# Patient Record
Sex: FEMALE | Race: WHITE | Employment: UNEMPLOYED | URBAN - METROPOLITAN AREA
[De-identification: names, ages, dates, MRNs, and addresses within clinical notes are randomized per-mention and may not be internally consistent; named-entity substitution may affect disease eponyms.]

---

## 2019-01-14 NOTE — H&P
Date: 10/29/2018 10:45 AM  
Patient Name: Tracy Ricardo Account #: [id number] Gender: Female  (age): 1970 (46) Provider:    
Heather Phalen. Amparo Boggs MD  
  
 
Referring Physician:    
Natalie Crain MD 
[address] [facility]. Patience NOBLE [address] 
[phone] (phone) 
[phone] (fax) Chief Complaint: Constipation History of Present Illness:  
46-year-old female with no significant past [place] [facility] history presents complaining of an approximate 5 year history of constipation described as infrequent bowel movements associated with bloating generalized abdominal discomfort which is relieved by bowel movement she has been using over-the-counter vegetable laxatives periodically to have movements per this been no bleeding or weight loss changes to caliber. There is no family history of colon cancer. She is aged 46 and is due for colorectal cancer screening. We discussed options of adding additional fiber to her diet and I gave her permission to use vegetable laxatives once or twice a week as needed. We discussed pharmacologic options but she would prefer to stay away from those at this time. ? 46-year-old female with no significant past [facility] history presents complaining of an approximate 5 year history of constipation described as infrequent bowel movements associated with bloating generalized abdominal discomfort which is relieved by bowel movement she has been using over-the-counter vegetable laxatives periodically to have movements per this been no bleeding or weight loss changes to caliber. There is no family history of colon cancer. She is aged 46 and is due for colorectal cancer screening. We discussed options of adding additional fiber to her diet and I gave her permission to use vegetable laxatives once or twice a week as needed. We discussed pharmacologic options but she would prefer to stay away from those at this time. ? Past Medical History Medical Conditions: Hemorrhoids Seizures Surgical Procedures: Ear SX, 2008 Hernia, 2000 Tonsils, 1973 Dx Studies: Abdominal U/S, 98, 99 Medications: Alyacen 1/35 (28) 1-35 mg-mcg TAKE 1 TABLET BY MOUTH EVERY DAY Allergies: Sumycin Immunizations: No Immunizations Social History Alcohol: Alcohol consumption: Weekly. Tobacco: Former smokerOther, quit 1989. Drugs: None Exercise: Exercise less than 3 times a week. Caffeine: None Marital Status:   
  
  
Occupation:   
  
  
  
 
Family History No history of Colon Cancer, Esophogeal Cancer, GI Cancers, IBD (Crohn's or UC), Liver disease Mother: Diagnosed with Family history of colon polyps; Father: Diagnosed with Prostate Cancer;   
  
Review of Systems:  
Cardiovascular: Presents suffers from irregular heart beat. Denies chest pain, palpitations, peripheral edema, syncope, Sweats. Constitutional: Denies fatigue, fever, loss of appetite, weight gain, weight loss. ENMT: Presents suffers from sore throat. Denies nose bleeds, hearing loss. Endocrine: Denies excessive thirst, heat intolerance. Eyes: Denies loss of vision. Gastrointestinal: Presents suffers from constipation, Bloating/gas. Denies abdominal pain, abdominal swelling, change in bowel habits, diarrhea, heartburn, jaundice, nausea, rectal bleeding, stomach cramps, vomiting, dysphagia, rectal pain, Stool incontinence, hematemesis. Genitourinary: Denies dark urine, dysuria, frequent urination, hematuria, incontinence. Hematologic/Lymphatic: Denies easy bruising, prolonged bleeding. Integumentary: Denies itching, rashes, sun sensitivity. Musculoskeletal: Presents suffers from joint pain. Denies arthritis, back pain, gout, muscle weakness, stiffness. Neurological: Denies dizziness, fainting, frequent headaches, memory loss. Psychiatric: Denies anxiety, depression, difficulty sleeping, hallucinations, nervousness, panic attacks, paranoia. Respiratory: Presents suffers from cough. Denies dyspnea, wheezing. Vital Signs:  
BP 
(mmHg)  Pulse 
(ppm) Rhythm Weight (lbs/oz) Height (ft/in) BMI Resp/min Temp 114/79 61 Regular 140 /  5 / 4 24.03 10 97.8 (F) Physical Exam:  
Constitutional:   
 
Appearance: No distress, appears comfortable. Communication: Understands/receives spoken information. Skin: Inspection: No rash, no jaundice. Palpation: No subcutaneous nodules. Head/face: Inspection: Normacephalic, atraumatic. Palpation: normal.  
Eyes:   
 
Conjunctivae/lids: Normal.  
Pupils/irises: Pupils equal, round and normal.  
ENMT:   
 
External: Normal.  
Hearing: Normal.  
Neck:   
 
Neck: Normal appearance, trachea midline. Jugular veins: No JVD noted. Respiratory:   
 
Effort: Normal respiratory effort, comfortable, speaks in complete sentences. Auscultation: normal breath sounds, no rubs, wheezes or rhonchi. Gastrointestinal/Abdomen:   
 
Abdomen: non-distended, nontender. Liver/Spleen: normal, normal size, Liver size and consistency normal, spleen is non-palpable. Musculoskeletal:   
 
Gait/station: normal.  
Digits/nails: Normal, no spooning of nails, clubbing, or splinter hemorrhages, no clubbing, cyanosis, petechiae or other inflammatory conditions. Psychiatric:   
 
Judgment/insight: Normal, normal judgement, normal insight. Orientation: oriented to time, space and person. Lymphatic:   
 
Neck: No lymphadenopathy in the cervical or supraclavicular chain. Other: No periumbilic lymphadenopathy. Lab Results: No Electronic Results Impressions: Screening colonoscopy (Screening for colonic neoplasia) Diarrhea, unspecified? ?Screening colonoscopy (Screening for colonic neoplasia)? ? 
? ? Diarrhea, unspecified? Assessment: ?   
  
 
Plan: Colonoscopy? ? Colonoscopy?    
  
 
Risk & Medical Necessity: The patient requires Moderate to High Severity care for this visit. Diagnosis and management options are Multiple. The amount of data reviewed and/or ordered is Minimal/None. The level of risk is Moderate. Notes:   
  
  
   
Buddy Erickson. Dayna Valera MD   
 Electronically signed on 10/29/2018 10:42:24 AM by Buddy Erickson. Dayna Valera MD  
  
  
  
  
   
   
  
  
   
Addmazin Harding, MRN [id number],  1970 First Visit, Monday, 2018 New Modify Delete Delete all Edit Wording Sign  
 
[email or web address]_Content

## 2019-01-15 ENCOUNTER — ANESTHESIA (OUTPATIENT)
Dept: ENDOSCOPY | Age: 49
End: 2019-01-15
Payer: OTHER GOVERNMENT

## 2019-01-15 ENCOUNTER — ANESTHESIA EVENT (OUTPATIENT)
Dept: ENDOSCOPY | Age: 49
End: 2019-01-15
Payer: OTHER GOVERNMENT

## 2019-01-15 ENCOUNTER — HOSPITAL ENCOUNTER (OUTPATIENT)
Age: 49
Setting detail: OUTPATIENT SURGERY
Discharge: HOME | End: 2019-01-15
Attending: SPECIALIST | Admitting: SPECIALIST
Payer: OTHER GOVERNMENT

## 2019-01-15 VITALS
DIASTOLIC BLOOD PRESSURE: 72 MMHG | RESPIRATION RATE: 14 BRPM | HEIGHT: 64 IN | HEART RATE: 57 BPM | SYSTOLIC BLOOD PRESSURE: 107 MMHG | OXYGEN SATURATION: 100 % | TEMPERATURE: 98.1 F | WEIGHT: 135 LBS | BODY MASS INDEX: 23.05 KG/M2

## 2019-01-15 LAB — HCG UR QL: NEGATIVE

## 2019-01-15 PROCEDURE — 77030013140 HC MSK NEB VYRM -A

## 2019-01-15 PROCEDURE — 81025 URINE PREGNANCY TEST: CPT

## 2019-01-15 PROCEDURE — 74011250636 HC RX REV CODE- 250/636

## 2019-01-15 PROCEDURE — 74011250636 HC RX REV CODE- 250/636: Performed by: PHYSICIAN ASSISTANT

## 2019-01-15 PROCEDURE — 74011000258 HC RX REV CODE- 258

## 2019-01-15 PROCEDURE — 76060000031 HC ANESTHESIA FIRST 0.5 HR: Performed by: SPECIALIST

## 2019-01-15 PROCEDURE — 76040000019: Performed by: SPECIALIST

## 2019-01-15 RX ORDER — FENTANYL CITRATE 50 UG/ML
25 INJECTION, SOLUTION INTRAMUSCULAR; INTRAVENOUS AS NEEDED
Status: DISCONTINUED | OUTPATIENT
Start: 2019-01-15 | End: 2019-01-15 | Stop reason: HOSPADM

## 2019-01-15 RX ORDER — DEXTROMETHORPHAN/PSEUDOEPHED 2.5-7.5/.8
1.2 DROPS ORAL
Status: DISCONTINUED | OUTPATIENT
Start: 2019-01-15 | End: 2019-01-15 | Stop reason: HOSPADM

## 2019-01-15 RX ORDER — EPINEPHRINE 0.1 MG/ML
1 INJECTION INTRACARDIAC; INTRAVENOUS
Status: DISCONTINUED | OUTPATIENT
Start: 2019-01-15 | End: 2019-01-15 | Stop reason: HOSPADM

## 2019-01-15 RX ORDER — MIDAZOLAM HYDROCHLORIDE 1 MG/ML
.25-5 INJECTION, SOLUTION INTRAMUSCULAR; INTRAVENOUS AS NEEDED
Status: DISCONTINUED | OUTPATIENT
Start: 2019-01-15 | End: 2019-01-15 | Stop reason: HOSPADM

## 2019-01-15 RX ORDER — PROPOFOL 10 MG/ML
INJECTION, EMULSION INTRAVENOUS
Status: DISCONTINUED | OUTPATIENT
Start: 2019-01-15 | End: 2019-01-15 | Stop reason: HOSPADM

## 2019-01-15 RX ORDER — OMEGA-3-ACID ETHYL ESTERS 1 G/1
2 CAPSULE, LIQUID FILLED ORAL 3 TIMES DAILY
COMMUNITY

## 2019-01-15 RX ORDER — ATROPINE SULFATE 0.1 MG/ML
0.5 INJECTION INTRAVENOUS
Status: DISCONTINUED | OUTPATIENT
Start: 2019-01-15 | End: 2019-01-15 | Stop reason: HOSPADM

## 2019-01-15 RX ORDER — FLUMAZENIL 0.1 MG/ML
0.2 INJECTION INTRAVENOUS
Status: DISCONTINUED | OUTPATIENT
Start: 2019-01-15 | End: 2019-01-15 | Stop reason: HOSPADM

## 2019-01-15 RX ORDER — SODIUM CHLORIDE 9 MG/ML
INJECTION, SOLUTION INTRAVENOUS
Status: DISCONTINUED | OUTPATIENT
Start: 2019-01-15 | End: 2019-01-15 | Stop reason: HOSPADM

## 2019-01-15 RX ORDER — PROPOFOL 10 MG/ML
INJECTION, EMULSION INTRAVENOUS AS NEEDED
Status: DISCONTINUED | OUTPATIENT
Start: 2019-01-15 | End: 2019-01-15 | Stop reason: HOSPADM

## 2019-01-15 RX ORDER — VITAMIN E (DL,TOCOPHERYL ACET) 180 MG
1500 CAPSULE ORAL DAILY
COMMUNITY

## 2019-01-15 RX ORDER — NALOXONE HYDROCHLORIDE 0.4 MG/ML
0.4 INJECTION, SOLUTION INTRAMUSCULAR; INTRAVENOUS; SUBCUTANEOUS
Status: DISCONTINUED | OUTPATIENT
Start: 2019-01-15 | End: 2019-01-15 | Stop reason: HOSPADM

## 2019-01-15 RX ORDER — LIDOCAINE HYDROCHLORIDE 20 MG/ML
INJECTION, SOLUTION EPIDURAL; INFILTRATION; INTRACAUDAL; PERINEURAL AS NEEDED
Status: DISCONTINUED | OUTPATIENT
Start: 2019-01-15 | End: 2019-01-15 | Stop reason: HOSPADM

## 2019-01-15 RX ORDER — SODIUM CHLORIDE 9 MG/ML
50 INJECTION, SOLUTION INTRAVENOUS CONTINUOUS
Status: DISCONTINUED | OUTPATIENT
Start: 2019-01-15 | End: 2019-01-15 | Stop reason: HOSPADM

## 2019-01-15 RX ADMIN — LIDOCAINE HYDROCHLORIDE 20 MG: 20 INJECTION, SOLUTION EPIDURAL; INFILTRATION; INTRACAUDAL; PERINEURAL at 09:07

## 2019-01-15 RX ADMIN — PROPOFOL 125 MCG/KG/MIN: 10 INJECTION, EMULSION INTRAVENOUS at 09:07

## 2019-01-15 RX ADMIN — SODIUM CHLORIDE: 9 INJECTION, SOLUTION INTRAVENOUS at 08:45

## 2019-01-15 RX ADMIN — PROPOFOL 50 MG: 10 INJECTION, EMULSION INTRAVENOUS at 09:07

## 2019-01-15 RX ADMIN — SODIUM CHLORIDE 50 ML/HR: 900 INJECTION, SOLUTION INTRAVENOUS at 08:57

## 2019-01-15 NOTE — PROCEDURES
[address] [facility] DENICE Correia MD  [phone]      January 15, 2019    Colonoscopy Procedure Note  [employer]  :  1970  [facility] Medical Record Number: [id number]    Indications:     Screening colonoscopy  PCP:  Vida Wilkinson MD  Anesthesia/Sedation: Conscious Sedation/Moderate Sedation/GETA, see notes  Endoscopist:  Dr. Liliam Gray  Complications:  None  Estimated Blood Loss:  None    Permit:  The indications, risks, benefits and alternatives were reviewed with the patient or their decision maker who was provided an opportunity to ask questions and all questions were answered. The specific risks of colonoscopy with conscious sedation were reviewed, including but not limited to anesthetic complication, bleeding, adverse drug reaction, missed lesion, infection, IV site reactions, and intestinal perforation which would lead to the need for surgical repair. Alternatives to colonoscopy including radiographic imaging, observation without testing, or laboratory testing were reviewed including the limitations of those alternatives. After considering the options and having all their questions answered, the patient or their decision maker provided both verbal and written consent to proceed. Procedure in Detail:  After obtaining informed consent, positioning of the patient in the left lateral decubitus position, and conduction of a pre-procedure pause or \"time out\" the endoscope was introduced into the anus and advanced to the cecum, which was identified by the ileocecal valve and appendiceal orifice. The quality of the colonic preparation was good. A careful inspection was made as the colonoscope was withdrawn, findings and interventions are described below. Findings: In the rectum, medium internal hemorrhoids are noted without bleeding.       Specimens:    none    Complications:   None; patient tolerated the procedure well. Impression:  Otherwise normal colonoscopy to the cecum    Recommendations:     - For colon cancer screening in this average-risk patient, colonoscopy may be repeated in 10 years. Thank you for entrusting me with this patient's care. Please do not hesitate to contact me with any questions or if I can be of assistance with any of your other patients' GI needs. Signed By: Chirag Massey MD                        January 15, 2019      Surgical assistant none. Implants none unless specified.

## 2019-01-15 NOTE — DISCHARGE INSTRUCTIONS
[address] [facility] DENICE Zimmerman MD  [phone]      January 15, 2019    Janette Rangel  YOB: 1970    COLONOSCOPY DISCHARGE INSTRUCTIONS    If there is redness at IV site you should apply warm compress to area. If redness or soreness persist contact Dr. Norbert Zimmerman' or your primary care doctor. There may be a slight amount of blood passed from the rectum. Gaseous discomfort may develop, but walking, belching will help relieve this. You may not operate a vehicle for 12 hours  You may not operate machinery or dangerous appliances for rest of today  You may not drink alcoholic beverages for 12 hours  Avoid making any critical decisions for 24 hours    DIET:  You may resume your normal diet, but some patients find that heavy or large meals may lead to indigestion or vomiting. I suggest a light meal as first food intake. MEDICATIONS:  The use of some over-the-counter pain medication may lead to bleeding after colon biopsies or polyp removal.  Tylenol (also called acetaminophen) is safe to take even if you have had colonoscopy with polyp removal.  Based on the procedure you had today you may safely take aspirin or aspirin-like products for the next ten (10) days. Remember that Tylenol (also called acetaminophen) is safe to take after colonoscopy even if you have had biopsies or polyps removed. ACTIVITY:  You may resume your normal household activities, but it is recommended that you spend the remainder of the day resting -  avoid any strenuous activity. CALL DR. Eliot Shelby' OFFICE IF:  Increasing pain, nausea, vomiting  Abdominal distension (swelling)  Significant new or increased bleeding (oral or rectal)  Fever/Chills  Chest pain/shortness of breath                       Additional instructions:   Aside from some internal hemorrhoids, this was a normal colonoscopy. Next colonoscopy 10 years.      It was an honor to be your doctor today. Please let me or my office staff know if you have any feedback about today's procedure. Rocael Stephens MD    Colonoscopy saves lives, and can prevent colon cancer. Everyone aged 48 or older needs colonoscopy.   Tell your family and friends: get the test!

## 2019-01-15 NOTE — PERIOP NOTES
Report from Alexa Darden, CRNA, see anesthesia record. ABD remains soft and non-tender post procedure. Pt has no complaints at this time and tolerated the procedure well. Endoscope was pre-cleaned at bedside immediately following procedure by [facility].

## 2019-01-15 NOTE — ROUTINE PROCESS
Lazara Metzger 1970 
[id number] Situation: 
Verbal report received from: Yassine Tyler RN Procedure: Procedure(s): 
COLONOSCOPY Background: 
 
Preoperative diagnosis: SCREENING, DIARRHEA Postoperative diagnosis: Hemorrhoids :  Dr. Shilpa Whittaker Assistant(s): Endoscopy RN-1: Chris Armendariz RN; Tasneem Turner RN Specimens: * No specimens in log * H. Pylori  no Assessment: 
Intra-procedure medications Anesthesia gave intra-procedure sedation and medications, see anesthesia flow sheet yes Intravenous fluids: NS@ Acquanetta Morris Vital signs stable Abdominal assessment: round and soft Recommendation: 
Discharge patient per MD order. Family or Friend Permission to share finding with family or friend yes

## 2019-01-15 NOTE — ANESTHESIA POSTPROCEDURE EVALUATION
Procedure(s): 
COLONOSCOPY. Anesthesia Post Evaluation Multimodal analgesia: multimodal analgesia used between 6 hours prior to anesthesia start to PACU discharge Patient location during evaluation: bedside Patient participation: complete - patient participated Level of consciousness: awake Pain management: adequate Airway patency: patent Anesthetic complications: no 
Cardiovascular status: acceptable Respiratory status: acceptable Hydration status: acceptable Visit Vitals /72 Pulse (!) 57 Temp 36.7 °C (98.1 °F) Resp 14 Ht 5' 4\" (1.626 m) Wt 61.2 kg (135 lb) SpO2 100% Breastfeeding? No  
BMI 23.17 kg/m²

## 2019-01-15 NOTE — ANESTHESIA PREPROCEDURE EVALUATION
Anesthetic History PONV Review of Systems / Medical History Patient summary reviewed and pertinent labs reviewed Pulmonary Within defined limits Neuro/Psych Within defined limits Cardiovascular Within defined limits Exercise tolerance: >4 METS 
  
GI/Hepatic/Renal 
Within defined limits Endo/Other Within defined limits Other Findings Physical Exam 
 
Airway Mallampati: II 
TM Distance: < 4 cm Neck ROM: normal range of motion Mouth opening: Normal 
 
 Cardiovascular Rhythm: regular Rate: normal 
 
 
 
 Dental 
 
Dentition: Caps/crowns Pulmonary Breath sounds clear to auscultation Abdominal 
 
 
 
 Other Findings Anesthetic Plan ASA: 1 Anesthesia type: MAC Induction: Intravenous Anesthetic plan and risks discussed with: Patient

## 2019-01-15 NOTE — INTERVAL H&P NOTE
Pre-Endoscopy H&P Update Chief complaint/HPI/ROS:  The indication for the procedure, the patient's history and the patient's current medications are reviewed prior to the procedure and that data is reported on the H&P to which this document is attached. Any significant complaints with regard to organ systems will be noted, and if not mentioned then a review of systems is not contributory. Past Medical History:  
Diagnosis Date  Nausea & vomiting  Seizures ([address]   
 no longer having seizures since age 14 Past Surgical History:  
Procedure Laterality Date  HX HEENT Bilateral   
 inner ear surgery  HX HERNIA REPAIR    
 umbilical  
 HX ORTHOPAEDIC Right   
 ankle - bone spur removed  HX TONSILLECTOMY Social  
Social History Tobacco Use  Smoking status: Former Smoker Last attempt to quit:  Years since quittin.0  Smokeless tobacco: Never Used Substance Use Topics  Alcohol use: Yes Alcohol/week: 1.2 oz Types: 2 Glasses of wine per week History reviewed. No pertinent family history. Allergies Allergen Reactions  Sumycin [Tetracycline] Rash Prior to Admission Medications Prescriptions Last Dose Informant Patient Reported? Taking? B.infantis-B.ani-B.long-B.bifi (PROBIOTIC 4X) 10-15 mg TbEC 2019  Yes Yes Sig: Take  by mouth daily. COLLAGEN 2019  Yes Yes Si g by Does Not Apply route daily. OTHER 2019  Yes Yes Sig: Take  by mouth daily. Daily Energy Whole Food Supplement  
norethindrone-ethinyl estrad (ALYACEN , 28, PO) 2019 at pm  Yes Yes Sig: Take  by mouth daily. omega-3 acid ethyl esters (LOVAZA) 1 gram capsule 2019  Yes Yes Sig: Take 2 g by mouth three (3) times daily. turmeric/turmeric ext/pepr ext [facility] EXT-PEPPER) 500-3 mg cap 2019  Yes Yes Sig: Take 1,500 mg by mouth daily. Facility-Administered Medications: None PHYSICAL EXAM:  The patient is examined immediately prior to the procedure. Visit Vitals BP 98/68 Pulse 62 Temp 97.7 °F (36.5 °C) Resp 17 Ht 5' 4\" (1.626 m) Wt 61.2 kg (135 lb) SpO2 100% Breastfeeding? No  
BMI 23.17 kg/m² Gen: Appears comfortable, no distress. Pulm: comfortable respirations with no abnormal audible breath sounds HEART: well perfused, no abnormal audible heart sounds GI: abdomen flat. PLAN:  Informed consent discussion held, patient afforded an opportunity to ask questions and all questions answered. After being advised of the risks, benefits, and alternatives, the patient requested that we proceed and indicated so on a written consent form. Will proceed with procedure as planned.  
Queta Gonzalez MD

## 2019-01-29 ENCOUNTER — APPOINTMENT (OUTPATIENT)
Dept: PHYSICAL THERAPY | Age: 49
End: 2019-01-29

## 2019-02-04 ENCOUNTER — HOSPITAL ENCOUNTER (OUTPATIENT)
Dept: PHYSICAL THERAPY | Age: 49
Discharge: HOME | End: 2019-02-04
Payer: OTHER GOVERNMENT

## 2019-02-04 PROCEDURE — 97110 THERAPEUTIC EXERCISES: CPT | Performed by: PHYSICAL MEDICINE & REHABILITATION

## 2019-02-04 PROCEDURE — 97530 THERAPEUTIC ACTIVITIES: CPT | Performed by: PHYSICAL MEDICINE & REHABILITATION

## 2019-02-04 PROCEDURE — 97162 PT EVAL MOD COMPLEX 30 MIN: CPT | Performed by: PHYSICAL MEDICINE & REHABILITATION

## 2019-02-12 ENCOUNTER — HOSPITAL ENCOUNTER (OUTPATIENT)
Dept: PHYSICAL THERAPY | Age: 49
Discharge: HOME | End: 2019-02-12
Payer: OTHER GOVERNMENT

## 2019-02-12 PROCEDURE — 97112 NEUROMUSCULAR REEDUCATION: CPT | Performed by: PHYSICAL THERAPIST

## 2019-02-12 PROCEDURE — 97530 THERAPEUTIC ACTIVITIES: CPT | Performed by: PHYSICAL THERAPIST

## 2019-02-12 NOTE — PROGRESS NOTES
PT DAILY TREATMENT NOTE 2-15 Patient Name: Dora Barlow Date:2019 : 1970 [x]  Patient  Verified Payor: DIANA / Plan: Hannah Hayes / Product Type: Adonis Alto / In time:330  Out time:430 Total Treatment Time (min): 60 Visit #: 2 Visit count could not be calculated. Make sure you are using a visit which is associated with an episode. Treatment Area: Separation of muscle (nontraumatic), other site [M62.08] SUBJECTIVE Pain Level (0-10 scale): 0/10 Any medication changes, allergies to medications, adverse drug reactions, diagnosis change, or new procedure performed?: [x] No    [] Yes (see summary sheet for update) Subjective functional status/changes:   [] No changes reported Patient reports no significant change since last visit OBJECTIVE 15 min Therapeutic Activity:  [x]  See flow sheet :  
Rationale: increase ROM, increase strength and improve coordination  to improve the patients ability to perform ADL's without pain 30 min Neuromuscular Re-education:  [x]  See flow sheet :  
Rationale: increase ROM, increase strength, improve coordination and increase proprioception  to improve the patients ability to return to all ADL's and recreational activities without pain With 
 [] TE 
 [] TA 
 [] neuro 
 [] other: Patient Education: [x] Review HEP [] Progressed/Changed HEP based on:  
[] positioning   [] body mechanics   [] transfers   [] heat/ice application   
[] other:   
 
Other Objective/Functional Measures: Progressed TrA exercises per flow sheet in supine, sit, stand for fucntion Pain Level (0-10 scale) post treatment: 0/10 ASSESSMENT/Changes in Function:  
 
Patient will continue to benefit from skilled PT services to modify and progress therapeutic interventions, address functional mobility deficits, address ROM deficits, address strength deficits, analyze and address soft tissue restrictions, analyze and cue movement patterns and analyze and modify body mechanics/ergonomics to attain remaining goals. []  See Plan of Care 
[]  See progress note/recertification 
[]  See Discharge Summary Progress towards goals / Updated goals: 
Patient demonstrates good potential to reach all PT goals at this time PLAN 
[]  Upgrade activities as tolerated     [x]  Continue plan of care 
[]  Update interventions per flow sheet      
[]  Discharge due to:_ 
[]  Other:_ Avtar Koroma, PT 2/12/2019

## 2019-02-26 ENCOUNTER — HOSPITAL ENCOUNTER (OUTPATIENT)
Dept: PHYSICAL THERAPY | Age: 49
Discharge: HOME | End: 2019-02-26
Payer: OTHER GOVERNMENT

## 2019-02-26 PROCEDURE — 97530 THERAPEUTIC ACTIVITIES: CPT | Performed by: PHYSICAL THERAPIST

## 2019-02-26 PROCEDURE — 97112 NEUROMUSCULAR REEDUCATION: CPT | Performed by: PHYSICAL THERAPIST

## 2019-02-26 NOTE — PROGRESS NOTES
PT DAILY TREATMENT NOTE 2-15 Patient Name: Marielle Gayle Date:2019 : 1970 [x]  Patient  Verified Payor: DIANA / Plan: Jr Man [address] / Product Type: Dorothy Hailekenn / In time:330  Out time:425 Total Treatment Time (min): 55 Visit #: 3 Visit count could not be calculated. Make sure you are using a visit which is associated with an episode. Treatment Area: Separation of muscle (nontraumatic), other site [M62.08] SUBJECTIVE Pain Level (0-10 scale): 0/10 Any medication changes, allergies to medications, adverse drug reactions, diagnosis change, or new procedure performed?: [x] No    [] Yes (see summary sheet for update) Subjective functional status/changes:   [] No changes reported Patient reports that she can already feel a difference in her core stability with exercise. She is trying to up her cardio during her weekly work-outs OBJECTIVE 15 min Therapeutic Activity:  [x]  See flow sheet :  
Rationale: increase ROM, increase strength and improve coordination  to improve the patients ability to perform ADL's without pain 40 min Neuromuscular Re-education:  [x]  See flow sheet :  
Rationale: increase ROM, increase strength, improve coordination and increase proprioception  to improve the patients ability to return to all ADL's and recreational activities without pain With 
 [] TE 
 [] TA 
 [] neuro 
 [] other: Patient Education: [x] Review HEP [] Progressed/Changed HEP based on:  
[] positioning   [] body mechanics   [] transfers   [] heat/ice application   
[] other:   
 
Other Objective/Functional Measures: Progressed TrA strengthening program with standing/functional activities Diastasis at umbilicus: 7.8KS Pain Level (0-10 scale) post treatment: 0/10 ASSESSMENT/Changes in Function:  
 
Patient will continue to benefit from skilled PT services to modify and progress therapeutic interventions, address functional mobility deficits, address ROM deficits, address strength deficits, analyze and address soft tissue restrictions, analyze and cue movement patterns and analyze and modify body mechanics/ergonomics to attain remaining goals. []  See Plan of Care 
[]  See progress note/recertification 
[]  See Discharge Summary Progress towards goals / Updated goals: 
Patient demonstrates good potential to reach all PT goals at this time PLAN [x]  Upgrade activities as tolerated     [x]  Continue plan of care 
[]  Update interventions per flow sheet      
[]  Discharge due to:_ 
[]  Other:_ Little Sanchez, PT DPT 2/26/2019

## 2019-03-05 ENCOUNTER — APPOINTMENT (OUTPATIENT)
Dept: PHYSICAL THERAPY | Age: 49
End: 2019-03-05
Payer: OTHER GOVERNMENT

## 2019-03-08 ENCOUNTER — APPOINTMENT (OUTPATIENT)
Dept: PHYSICAL THERAPY | Age: 49
End: 2019-03-08
Payer: OTHER GOVERNMENT

## 2019-03-11 ENCOUNTER — APPOINTMENT (OUTPATIENT)
Dept: PHYSICAL THERAPY | Age: 49
End: 2019-03-11
Payer: OTHER GOVERNMENT

## 2019-03-13 ENCOUNTER — HOSPITAL ENCOUNTER (OUTPATIENT)
Dept: PHYSICAL THERAPY | Age: 49
Discharge: HOME | End: 2019-03-13
Payer: OTHER GOVERNMENT

## 2019-03-13 PROCEDURE — 97110 THERAPEUTIC EXERCISES: CPT | Performed by: PHYSICAL THERAPIST

## 2019-03-25 ENCOUNTER — APPOINTMENT (OUTPATIENT)
Dept: PHYSICAL THERAPY | Age: 49
End: 2019-03-25
Payer: OTHER GOVERNMENT

## 2019-07-17 ENCOUNTER — HOSPITAL ENCOUNTER (OUTPATIENT)
Dept: PHYSICAL THERAPY | Age: 49
Discharge: HOME | End: 2019-07-17
Payer: OTHER GOVERNMENT

## 2019-07-17 PROCEDURE — 97112 NEUROMUSCULAR REEDUCATION: CPT | Performed by: PHYSICAL THERAPIST

## 2019-07-17 PROCEDURE — 97161 PT EVAL LOW COMPLEX 20 MIN: CPT | Performed by: PHYSICAL THERAPIST

## 2019-07-17 NOTE — PROGRESS NOTES
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 After she had the left ear done, her hearing was restored. After she had her right ear done she had vertigo. Pt had vertigo and it has been fine since then. \"I have had hte Epley maneuver done twice. I started getting the dizziness back 2 weeks ago. \"I had a bad cold 1.5 months ago\"  I went to my MD who said her L ear was full of fluid and told her to get on Claritin. Pt had some L jaw/ tooth pain. Her dentist said it wasn't related to the tooth pain. The dentist put her on amoxicillin and to stop taking the clariton. \"The dizziness is better but its not gone. Ear/ jaw pain is worse if she eats or drinks. Pt has symptoms when she blow drys her hair. \"Sometimes if I'm sitting still and turn to look at something, I feel dizzy\"  Symptoms are with head turns both ways.     I notice it when I go to [employer] or a concert, \"the longer I sit there the more unsteady I feel\"  Pt has had room spinning dizziness  Pt reports she wears glasses only for reading fine print  PLOF: Pt enjoys walking  Mechanism of Injury: Surgery 10 years ago  Previous Treatment/Compliance: Yes  PMHx/Surgical Hx: Epilepsy, Inner ear surgery, umbilical hernia, bone spur L ankle   Work Hx: Not working  Living Situation: Pt lives in a one story home  Pt Goals: \"alleviate dizziness\"  Barriers: chronic nature of condition  Motivation: Good  Substance use: alcohol   Cognition: A & O x 3        OBJECTIVE:  Vision:   Smooth Pursuit: slight increase in dizziness in the horizontal plane   Gaze stabilization: saccades present in the horizontal plane  Vertebral Artery Test: Negative B  BPPV:  Chriss Hallpike: Negative B  Roll Test: Negative B  Head Hang: Negative  Balance Tests:   Rhomberg: EO 30 s EC 30 s   Sharpened Rhomberg: EO 30 sEC R 27 s L 22 s   Single Leg R 30 s L 30 s On pillow 30 s B    15 min Neuromuscular Re-education:  [x]  See flow sheet :   Rationale: increase ROM, increase strength, improve coordination, improve balance and increase proprioception  to improve the patients ability to sit, stand, transfer, ambulate, lift, carry, reach, complete ADLs        With   [x] TE   [] TA   [x] neuro   [] other: Patient Education: [x] Review HEP    [] Progressed/Changed HEP based on:   [x] positioning   [x] body mechanics   [] transfers   [x] heat/ice application    [] other:        Other Objective/Functional Measures: dizziness with balance and VOR    Pain Level (0-10 scale) post treatment: 0      ASSESSMENT:      [x]  See Plan of [address] [place] [address], PT 7/17/2019

## 2019-07-17 NOTE — PROGRESS NOTES
[address] [place] [address] Diana White 55  Phone: [phone]  Fax: [phone]    Plan of Care/ Statement of Necessity for Physical Therapy Services 2-15    Patient name: Ham Feliz  : 1970  Provider#: [id number]  Referral source: Kristine Short, *      Medical/Treatment Diagnosis: Dizziness and giddiness [R42]     Prior Hospitalization: see medical history     Comorbidities: Epilepsy, Inner ear surgery, umbilical hernia, bone spur L ankle  Prior Level of Function: Pt enjoys walking  Medications: Verified on Patient Summary List    Start of Care: 1      Onset Date: 10 years ago, recent flare up 2 weeks ago       The Plan of Care and following information is based on the information from the initial evaluation. Assessment/ key information: The patient presents with signs and symptoms consistent with vestibular dysfunction as indicated by difficulty performing eyes closed balance and oculomotor dysfunction. The patient's condition is complicated by chronic nature of condition. The patient demonstrates negative kaylene hallpike, negative roll test and negative head hang this visit indicating no presence of BPPV. Evaluation Complexity History HIGH Complexity :3+ comorbidities / personal factors will impact the outcome/ POC ; Examination HIGH Complexity : 4+ Standardized tests and measures addressing body structure, function, activity limitation and / or participation in recreation  ;Presentation MEDIUM Complexity : Evolving with changing characteristics  ; Clinical Decision Making LOW Complexity : FOTO score of   Overall Complexity Rating: LOW     Problem List: pain affecting function, decrease ROM, decrease strength, edema affecting function, impaired gait/ balance, decrease ADL/ functional abilitiies, decrease activity tolerance, decrease flexibility/ joint mobility and decrease transfer abilities   Treatment Plan may include any combination of the following: Therapeutic exercise, Therapeutic activities, Neuromuscular re-education, Physical agent/modality, Gait/balance training, Manual therapy, Patient education and Functional mobility training  Patient / Family readiness to learn indicated by: asking questions, trying to perform skills and interest  Persons(s) to be included in education: patient (P)  Barriers to Learning/Limitations: None  Patient Goal (s): alleviate dizziness  Patient Self Reported Health Status: excellent  Rehabilitation Potential: excellent    Short Term Goals: To be accomplished in 4 weeks:  1) The patient will be independent with introductory HEP  2) The patient will demonstrate negative sharpened Romberg to indicate improved static stability  3) The patient will report ability to resume walking routine wihtout an increase in symptoms  Long Term Goals: To be accomplished in 12 weeks:  1) The patient will report ability to go to [employer] without an increase in symptoms  2) The patient will report ability to walk on uneven surfaces without an increase in symptoms  3) The patient will report ability to complete household chores without an increase in symptoms  Frequency / Duration: Patient to be seen 1 times per week for 12 weeks. Patient/ Caregiver education and instruction: self care, activity modification and exercises    [x]  Plan of care has been reviewed with ALICE Alvarado, PT 7/17/2019     ________________________________________________________________________    I certify that the above Therapy Services are being furnished while the patient is under my care. I agree with the treatment plan and certify that this therapy is necessary.     [de-identified] Signature:____________________  Date:____________Time: _________

## 2019-07-22 ENCOUNTER — HOSPITAL ENCOUNTER (OUTPATIENT)
Dept: PHYSICAL THERAPY | Age: 49
Discharge: HOME | End: 2019-07-22
Payer: OTHER GOVERNMENT

## 2019-07-22 PROCEDURE — 97140 MANUAL THERAPY 1/> REGIONS: CPT | Performed by: PHYSICAL THERAPIST

## 2019-07-22 PROCEDURE — 97112 NEUROMUSCULAR REEDUCATION: CPT | Performed by: PHYSICAL THERAPIST

## 2019-07-22 NOTE — PROGRESS NOTES
PT DAILY TREATMENT NOTE 2-15    Patient Name: Ham Feliz  Date:2019  : 1970  [x]  Patient  Verified  Payor: DIANA / Plan: Jr Man 74 / Product Type:  /    In time:5:00 PM  Out time:5:35 PM  Total Treatment Time (min): 35  Visit #:  2    Treatment Area: Dizziness and giddiness [R42]    SUBJECTIVE  Pain Level (0-10 scale): 0  Any medication changes, allergies to medications, adverse drug reactions, diagnosis change, or new procedure performed?: [x] No    [] Yes (see summary sheet for update)  Subjective functional status/changes:   [] No changes reported  Pt reports she was out at dinner in a courtyard area and she had an increase in dizziness  \"I had a root canal Friday\"    OBJECTIVE    25 min Neuromuscular Re-education:  [x]  See flow sheet :   Rationale: improve coordination, improve balance and increase proprioception  to improve the patients ability to sit, stand, transfer, ambulate, lift, carry, reach, complete ADLs    10 min Manual Therapy:  Cervical PROM to tolerance, infraclavicular pumping   Rationale: decrease pain, increase ROM, increase tissue extensibility and decrease trigger points  to improve the patients ability to sit, stand, transfer, ambulate, lift, carry, reach, complete ADLs        With   [x] TE   [] TA   [x] neuro   [] other: Patient Education: [x] Review HEP    [] Progressed/Changed HEP based on:   [x] positioning   [x] body mechanics   [] transfers   [x] heat/ice application    [] other:      Other Objective/Functional Measures:   No LOB with SLS on pillow EO and head nod    Pt unable to advance EC balance exercises     Pain at end range shoulder flexion (L)    Pain Level (0-10 scale) post treatment: 0    ASSESSMENT/Changes in Function:     Patient will continue to benefit from skilled PT services to modify and progress therapeutic interventions, address functional mobility deficits, address ROM deficits, address strength deficits, analyze and address soft tissue restrictions, analyze and cue movement patterns, analyze and modify body mechanics/ergonomics, assess and modify postural abnormalities, address imbalance/dizziness and instruct in home and community integration to attain remaining goals. []  See Plan of Care  []  See progress note/recertification  []  See Discharge Summary         Progress towards goals / Updated goals:  Patient continues to require verbal cues to complete exercises with correct form and postural awareness. Patient was able to advance several exercises this visit and is progressing well towards goals.     PLAN  [x]  Upgrade activities as tolerated     [x]  Continue plan of care  [x]  Update interventions per flow sheet       []  Discharge due to:_  []  Other:_      Rainaar Stage, PT 7/22/2019

## 2019-08-08 ENCOUNTER — APPOINTMENT (OUTPATIENT)
Dept: PHYSICAL THERAPY | Age: 49
End: 2019-08-08
Payer: OTHER GOVERNMENT

## 2019-08-14 ENCOUNTER — HOSPITAL ENCOUNTER (OUTPATIENT)
Dept: PHYSICAL THERAPY | Age: 49
Discharge: HOME | End: 2019-08-14
Payer: OTHER GOVERNMENT

## 2019-08-14 PROCEDURE — 97140 MANUAL THERAPY 1/> REGIONS: CPT | Performed by: PHYSICAL THERAPIST

## 2019-08-14 NOTE — PROGRESS NOTES
PT DAILY TREATMENT NOTE 2-15    Patient Name: Maryam Anders  Date:2019  : 1970  [x]  Patient  Verified  Payor:  / Plan: [facility]  [place] / Product Type:  /    In time:5:00 PM  Out time:5:30 PM  Total Treatment Time (min): 30  Visit #:  3    Treatment Area: Dizziness and giddiness [R42]    SUBJECTIVE  Pain Level (0-10 scale): 0  Any medication changes, allergies to medications, adverse drug reactions, diagnosis change, or new procedure performed?: [x] No    [] Yes (see summary sheet for update)  Subjective functional status/changes:   [] No changes reported  Pt reports she was cooking something at the stove, looking down and \"everything started to feel like it was spinning\" \"It only lasted 2 seconds  Pt reports since then she has had a constant feeling of not feeling right and a couple more episodes of room spinning dizziness    OBJECTIVE    0 min Neuromuscular Re-education:  [x]  See flow sheet :   Rationale: improve coordination, improve balance and increase proprioception  to improve the patients ability to sit, stand, transfer, ambulate, lift, carry, reach, complete ADLs    30 min Manual Therapy:  BBQ Roll x2   Rationale: decrease pain, increase ROM, increase tissue extensibility and decrease trigger points  to improve the patients ability to sit, stand, transfer, ambulate, lift, carry, reach, complete ADLs        With   [x] TE   [] TA   [x] neuro   [] other: Patient Education: [x] Review HEP    [] Progressed/Changed HEP based on:   [x] positioning   [x] body mechanics   [] transfers   [x] heat/ice application    [] other:      Other Objective/Functional Measures:   + Roll Test on R x 2  Pain Level (0-10 scale) post treatment: 0    ASSESSMENT/Changes in Function:     Patient will continue to benefit from skilled PT services to modify and progress therapeutic interventions, address functional mobility deficits, address ROM deficits, address strength deficits, analyze and address soft tissue restrictions, analyze and cue movement patterns, analyze and modify body mechanics/ergonomics, assess and modify postural abnormalities, address imbalance/dizziness and instruct in home and community integration to attain remaining goals. []  See Plan of Care  []  See progress note/recertification  []  See Discharge Summary         Progress towards goals / Updated goals:  Treating Vertigo.     PLAN  [x]  Upgrade activities as tolerated     [x]  Continue plan of care  [x]  Update interventions per flow sheet       []  Discharge due to:_  []  Other:_      Mara Finley, PT 8/14/2019

## 2019-08-21 NOTE — PROGRESS NOTES
[address] [place] [address] Diana White 55  Phone: [phone]  Fax: [phone]    Discharge Summary  2-15    Patient name: Jackie Feliciano  : 1970  Provider#: [id number]  Referral source: Dirk Odor*      Medical/Treatment Diagnosis: Separation of muscle (nontraumatic), other site [M62.08]     Prior Hospitalization: see medical history     Comorbidities: See Plan of Care  Prior Level of Function:See Plan of Care  Medications: Verified on Patient Summary List    Start of Care: 19      Onset Date:1+ years   Visits from Start of Care: 4     Missed Visits: 0  Reporting Period : 19 to 3/13/19      ASSESSMENT/SUMMARY OF CARE: Patient did not return for follow up.   Unable to assess goals at this time          RECOMMENDATIONS:  [x]Discontinue therapy: []Patient has reached or is progressing toward set goals      [x]Patient is non-compliant or has abdicated      []Due to lack of appreciable progress towards set goals      []Other    Rozanne Severance, PT 2019

## 2019-08-27 ENCOUNTER — HOSPITAL ENCOUNTER (OUTPATIENT)
Dept: PHYSICAL THERAPY | Age: 49
Discharge: HOME | End: 2019-08-27
Payer: OTHER GOVERNMENT

## 2019-08-27 PROCEDURE — 97112 NEUROMUSCULAR REEDUCATION: CPT | Performed by: PHYSICAL THERAPIST

## 2019-08-27 PROCEDURE — 97140 MANUAL THERAPY 1/> REGIONS: CPT | Performed by: PHYSICAL THERAPIST

## 2019-08-27 NOTE — PROGRESS NOTES
[facility]  [address] [place] [address] Diana White  Phone: [phone]      Fax:  [phone]    Progress Note    Name: Worth Hammans   : 1970   MD: Greg Carvalho, *       Treatment Diagnosis: Dizziness and giddiness [R42]  Start of Care: 19    Visits from Start of Care: 4  Missed Visits: 1    Summary of Care: Therapeutic Exercise,  Manual Therapy, Neuromuscular Re-education,Patient Education, Home Exercise Program         Assessment / Recommendations: The patient has completed 4 visits and has made significant gains in static balance and demonstrates improved activity tolerance. The patient has no trouble performing her walking routine and household chores. The patient continues to be limited by intermittent dizziness, affecting her ability to function in crowded/ busy environments. The patient has met all short term goals and would benefit from continued PT to reach long term goals. Short Term Goals: To be accomplished in 4 weeks:  1) The patient will be independent with introductory HEP - MET  2) The patient will demonstrate negative sharpened Romberg to indicate improved static stability - MET  3) The patient will report ability to resume walking routine wihtout an increase in symptoms - MET  Long Term Goals: To be accomplished in 12 weeks:  1) The patient will report ability to go to [employer] without an increase in symptoms - Progressing towards  2) The patient will report ability to walk on uneven surfaces without an increase in symptoms - Progressing towards  3) The patient will report ability to complete household chores without an increase in symptoms - MET      Missy Schroeder, PT 2019    ________________________________________________________________________  NOTE TO PHYSICIAN:  Please complete the following and fax to: [facility]: [phone]  .  Retain this original for your records. If you are unable to process this request in 24 hours, please contact our office.        ____ I have read the above report and request that my patient continue therapy with the following changes/special instructions:  ____ I have read the above report and request that my patient be discharged from therapy    Physician's Signature:_________________ Date:___________Time:__________

## 2019-08-27 NOTE — PROGRESS NOTES
PT DAILY TREATMENT NOTE 2-15    Patient Name: Eugenio Parkinson  Date:2019  : 1970  [x]  Patient  Verified  Payor: DIANA / Plan: Franco Chung / Product Type:  /    In time: 9:20 AM  Out time: 9:55 AM  Total Treatment Time (min): 35  Visit #:  4    Treatment Area: Dizziness and giddiness [R42]    SUBJECTIVE  Pain Level (0-10 scale): 0  Any medication changes, allergies to medications, adverse drug reactions, diagnosis change, or new procedure performed?: [x] No    [] Yes (see summary sheet for update)  Subjective functional status/changes:   [] No changes reported  Pt reports she felt much better after last visit  Pt reports she has done it on her own several times, \"sometimes 2 times per day\"  Dizziness feels better in some ways but its different but some days its more frequent  Pt reports she has only had 2 days of vertigo    OBJECTIVE    20 min Neuromuscular Re-education:  [x]  See flow sheet : review HEP   Rationale: improve coordination, improve balance and increase proprioception  to improve the patients ability to sit, stand, transfer, ambulate, lift, carry, reach, complete ADLs    15 min Manual Therapy:  R CRT   Rationale: decrease pain, increase ROM, increase tissue extensibility and decrease trigger points  to improve the patients ability to sit, stand, transfer, ambulate, lift, carry, reach, complete ADLs        With   [x] TE   [] TA   [x] neuro   [] other: Patient Education: [x] Review HEP    [] Progressed/Changed HEP based on:   [x] positioning   [x] body mechanics   [] transfers   [x] heat/ice application    [] other:      Other Objective/Functional Measures:   + [place] Hallpike    Tandem EC No LOB  Pain Level (0-10 scale) post treatment: 0    ASSESSMENT/Changes in Function:     Patient will continue to benefit from skilled PT services to modify and progress therapeutic interventions, address functional mobility deficits, address ROM deficits, address strength deficits, analyze and address soft tissue restrictions, analyze and cue movement patterns, analyze and modify body mechanics/ergonomics, assess and modify postural abnormalities, address imbalance/dizziness and instruct in home and community integration to attain remaining goals. []  See Plan of Care  [x]  See progress note/recertification  []  See Discharge Summary         Progress towards goals / Updated goals:  See prog note.     PLAN  [x]  Upgrade activities as tolerated     [x]  Continue plan of care  [x]  Update interventions per flow sheet       []  Discharge due to:_  []  Other:_      Maurilio Munoz, PT 8/27/2019

## 2019-09-25 ENCOUNTER — HOSPITAL ENCOUNTER (OUTPATIENT)
Dept: PHYSICAL THERAPY | Age: 49
Discharge: HOME | End: 2019-09-25
Payer: OTHER GOVERNMENT

## 2019-09-25 PROCEDURE — 97112 NEUROMUSCULAR REEDUCATION: CPT | Performed by: PHYSICAL THERAPIST

## 2019-09-25 NOTE — PROGRESS NOTES
PT DAILY TREATMENT NOTE 2-15    Patient Name: Tab Dejesus  Date:2019  : 1970  [x]  Patient  Verified  Payor: DIANA / Plan: Jr Man [employer] / Product Type:  /    In time: 9:30 AM  Out time: 10:00 AM  Total Treatment Time (min): 30  Visit #:  5    Treatment Area: Dizziness and giddiness [R42]    SUBJECTIVE  Pain Level (0-10 scale): 0  Any medication changes, allergies to medications, adverse drug reactions, diagnosis change, or new procedure performed?: [x] No    [] Yes (see summary sheet for update)  Subjective functional status/changes:   [] No changes reported  Pt reports she had an [place] the airport.   \"I don't feel like its 100% better when I'm walking\"   \"I started taking Claritin every day\"  Pt has room spinning dizziness during the vasquez daroff exercises on the first set    OBJECTIVE    30 min Neuromuscular Re-education:  [x]  See flow sheet : review HEP   Rationale: improve coordination, improve balance and increase proprioception  to improve the patients ability to sit, stand, transfer, ambulate, lift, carry, reach, complete ADLs    0 min Manual Therapy:     Rationale: decrease pain, increase ROM, increase tissue extensibility and decrease trigger points  to improve the patients ability to sit, stand, transfer, ambulate, lift, carry, reach, complete ADLs        With   [x] TE   [] TA   [x] neuro   [] other: Patient Education: [x] Review HEP    [] Progressed/Changed HEP based on:   [x] positioning   [x] body mechanics   [] transfers   [x] heat/ice application    [] other:      Other Objective/Functional Measures:   - DH    Tandem EC 1 LOB on 2/4 sets  Pain Level (0-10 scale) post treatment: 0    ASSESSMENT/Changes in Function:     Patient will continue to benefit from skilled PT services to modify and progress therapeutic interventions, address functional mobility deficits, address ROM deficits, address strength deficits, analyze and address soft tissue restrictions, analyze and cue movement patterns, analyze and modify body mechanics/ergonomics, assess and modify postural abnormalities, address imbalance/dizziness and instruct in home and community integration to attain remaining goals.      []  See Plan of Care  []  See progress note/recertification  []  See Discharge Summary         Progress towards goals / Updated goals:  Reassess in 2 weeks    PLAN  [x]  Upgrade activities as tolerated     [x]  Continue plan of care  [x]  Update interventions per flow sheet       []  Discharge due to:_  []  Other:_      Jarocho Mansfield, PT 9/25/2019

## 2019-10-09 ENCOUNTER — HOSPITAL ENCOUNTER (OUTPATIENT)
Dept: PHYSICAL THERAPY | Age: 49
Discharge: HOME | End: 2019-10-09
Payer: OTHER GOVERNMENT

## 2019-10-09 PROCEDURE — 97112 NEUROMUSCULAR REEDUCATION: CPT | Performed by: PHYSICAL THERAPIST

## 2019-10-09 NOTE — PROGRESS NOTES
PT DAILY TREATMENT NOTE 2-15    Patient Name: Aravind Manning  Date:10/9/2019  : 1970  [x]  Patient  Verified  Payor:  / Plan: Jr Man [employer] / Product Type:  /    In time: 9:45 AM  Out time: 10:25 AM  Total Treatment Time (min): 40  Visit #:  6    Treatment Area: Dizziness and giddiness [R42]    SUBJECTIVE  Pain Level (0-10 scale): 0  Any medication changes, allergies to medications, adverse drug reactions, diagnosis change, or new procedure performed?: [x] No    [] Yes (see summary sheet for update)  Subjective functional status/changes:   [] No changes reported  Pt reports she has had some dizziness off and on  Pt reports over the last week she has been feeling much better  She is able to tolerate Quaker much better    OBJECTIVE    40 min Neuromuscular Re-education:  [x]  See flow sheet : review HEP   Rationale: improve coordination, improve balance and increase proprioception  to improve the patients ability to sit, stand, transfer, ambulate, lift, carry, reach, complete ADLs    0 min Manual Therapy:     Rationale: decrease pain, increase ROM, increase tissue extensibility and decrease trigger points  to improve the patients ability to sit, stand, transfer, ambulate, lift, carry, reach, complete ADLs        With   [x] TE   [] TA   [x] neuro   [] other: Patient Education: [x] Review HEP    [] Progressed/Changed HEP based on:   [x] positioning   [x] body mechanics   [] transfers   [x] heat/ice application    [] other:      Other Objective/Functional Measures:   ? DH + on R    No LOB with sharpened romberg    VOR x1 facing checkerboard, increased dizziness     Pain Level (0-10 scale) post treatment: 0    ASSESSMENT/Changes in Function:     Patient will continue to benefit from skilled PT services to modify and progress therapeutic interventions, address functional mobility deficits, address ROM deficits, address strength deficits, analyze and address soft tissue restrictions, analyze and cue movement patterns, analyze and modify body mechanics/ergonomics, assess and modify postural abnormalities, address imbalance/dizziness and instruct in home and community integration to attain remaining goals.      []  See Plan of Care  []  See progress note/recertification  []  See Discharge Summary         Progress towards goals / Updated goals:  Reassess in 2 weeks, given shoe handout, advanced vest HEP    PLAN  [x]  Upgrade activities as tolerated     [x]  Continue plan of care  [x]  Update interventions per flow sheet       []  Discharge due to:_  []  Other:_      Tamia Darnell, PT 10/9/2019

## 2019-10-23 ENCOUNTER — HOSPITAL ENCOUNTER (OUTPATIENT)
Dept: PHYSICAL THERAPY | Age: 49
Discharge: HOME | End: 2019-10-23
Payer: OTHER GOVERNMENT

## 2019-10-23 PROCEDURE — 97112 NEUROMUSCULAR REEDUCATION: CPT | Performed by: PHYSICAL THERAPIST

## 2019-10-23 NOTE — PROGRESS NOTES
PT DAILY TREATMENT NOTE 2-15    Patient Name: Clovis Velazquez  Date:10/23/2019  : 1970  [x]  Patient  Verified  Payor: DIANA / Plan: Francia Somers / Product Type:  /    In time: 9:25 AM  Out time: 10:55 AM  Total Treatment Time (min): 30  Visit #:  7    Treatment Area: Dizziness and giddiness [R42]    SUBJECTIVE  Pain Level (0-10 scale): 0  Any medication changes, allergies to medications, adverse drug reactions, diagnosis change, or new procedure performed?: [x] No    [] Yes (see summary sheet for update)  Subjective functional status/changes:   [] No changes reported  Pt reports she has felt great over the past 2 weeks with all activities    OBJECTIVE    30 min Neuromuscular Re-education:  [x]  See flow sheet : review HEP   Rationale: improve coordination, improve balance and increase proprioception  to improve the patients ability to sit, stand, transfer, ambulate, lift, carry, reach, complete ADLs    0 min Manual Therapy:     Rationale: decrease pain, increase ROM, increase tissue extensibility and decrease trigger points  to improve the patients ability to sit, stand, transfer, ambulate, lift, carry, reach, complete ADLs        With   [x] TE   [] TA   [x] neuro   [] other: Patient Education: [x] Review HEP    [] Progressed/Changed HEP based on:   [x] positioning   [x] body mechanics   [] transfers   [x] heat/ice application    [] other:      Other Objective/Functional Measures:     No LOB with sharpened romberg    VOR x1 facing checkerboard, no increase in dizziness with NBOS  Slight dizziness with VOR x2 facing pattern    Pain Level (0-10 scale) post treatment: 0    ASSESSMENT/Changes in Function:     Patient will continue to benefit from skilled PT services to modify and progress therapeutic interventions, address functional mobility deficits, address ROM deficits, address strength deficits, analyze and address soft tissue restrictions, analyze and cue movement patterns, analyze and modify body mechanics/ergonomics, assess and modify postural abnormalities, address imbalance/dizziness and instruct in home and community integration to attain remaining goals. []  See Plan of Care  []  See progress note/recertification  []  See Discharge Summary         Progress towards goals / Updated goals:   Will d/c if no flare ups over the next month    PLAN  [x]  Upgrade activities as tolerated     [x]  Continue plan of care  [x]  Update interventions per flow sheet       []  Discharge due to:_  []  Other:_      Arley Banks, PT 10/23/2019

## 2020-01-06 NOTE — ANCILLARY DISCHARGE INSTRUCTIONS
[address] [place] [address] Diana White 55  Phone: [phone]  Fax: [phone]    Discharge Summary  2-15    Patient name: Mirza Peterson  : 1970  Provider#: [id number]  Referral source: Geetha Balloon, *      Medical/Treatment Diagnosis: Dizziness and giddiness [R42]     Prior Hospitalization: see medical history     Comorbidities: See Plan of Care  Prior Level of Function:See Plan of Care  Medications: Verified on Patient Summary List    Start of Care: 19      Onset Date:10 years ago   Visits from Start of Care: 7     Missed Visits: 1  Reporting Period : 19 to 10/23/19      ASSESSMENT/SUMMARY OF CARE: The patient was last seen 10/23/19 with no dizziness. The patient's account was held open in case there was a flare up. The patient has not needed PT since last visit.           RECOMMENDATIONS:  [x]Discontinue therapy: [x]Patient has reached or is progressing toward set goals      []Patient is non-compliant or has abdicated      []Due to lack of appreciable progress towards set goals      []Other    Marilia Paulino, PT 2020

## 2024-07-01 ENCOUNTER — OFFICE VISIT (OUTPATIENT)
Dept: FAMILY MEDICINE | Facility: CLINIC | Age: 54
End: 2024-07-01
Payer: OTHER GOVERNMENT

## 2024-07-01 VITALS
WEIGHT: 141.08 LBS | BODY MASS INDEX: 23.5 KG/M2 | TEMPERATURE: 97.2 F | OXYGEN SATURATION: 99 % | SYSTOLIC BLOOD PRESSURE: 101 MMHG | DIASTOLIC BLOOD PRESSURE: 69 MMHG | HEART RATE: 65 BPM | HEIGHT: 65 IN

## 2024-07-01 DIAGNOSIS — N95.1 PERIMENOPAUSAL: ICD-10-CM

## 2024-07-01 DIAGNOSIS — Z00.00 ENCOUNTER FOR MEDICAL EXAMINATION TO ESTABLISH CARE: Primary | ICD-10-CM

## 2024-07-01 DIAGNOSIS — Z91.09 ENVIRONMENTAL ALLERGIES: ICD-10-CM

## 2024-07-01 PROBLEM — J30.1 SEASONAL ALLERGIC RHINITIS DUE TO POLLEN: Status: ACTIVE | Noted: 2023-04-24

## 2024-07-01 PROBLEM — H81.10 BENIGN PAROXYSMAL POSITIONAL VERTIGO: Status: ACTIVE | Noted: 2021-12-07

## 2024-07-01 PROBLEM — G43.009 MIGRAINE WITHOUT AURA AND WITHOUT STATUS MIGRAINOSUS, NOT INTRACTABLE: Status: ACTIVE | Noted: 2022-10-19

## 2024-07-01 PROBLEM — M25.569 KNEE PAIN: Status: ACTIVE | Noted: 2023-04-26

## 2024-07-01 PROBLEM — E78.5 DYSLIPIDEMIA: Status: ACTIVE | Noted: 2023-11-17

## 2024-07-01 PROBLEM — R00.2 PALPITATIONS: Status: ACTIVE | Noted: 2024-04-30

## 2024-07-01 PROBLEM — M54.81 CERVICO-OCCIPITAL NEURALGIA: Status: ACTIVE | Noted: 2021-12-07

## 2024-07-01 PROBLEM — M47.812 CERVICAL SPONDYLOSIS WITHOUT MYELOPATHY: Status: ACTIVE | Noted: 2022-02-15

## 2024-07-01 PROCEDURE — 83001 ASSAY OF GONADOTROPIN (FSH): CPT | Performed by: FAMILY MEDICINE

## 2024-07-01 PROCEDURE — 82670 ASSAY OF TOTAL ESTRADIOL: CPT | Performed by: FAMILY MEDICINE

## 2024-07-01 PROCEDURE — 83002 ASSAY OF GONADOTROPIN (LH): CPT | Performed by: FAMILY MEDICINE

## 2024-07-01 RX ORDER — OMEGA-3/DHA/EPA/FISH OIL 60 MG-90MG
1 CAPSULE ORAL DAILY
COMMUNITY

## 2024-07-01 RX ORDER — FLUTICASONE PROPIONATE 50 MCG
1 SPRAY, SUSPENSION (ML) NASAL DAILY
COMMUNITY

## 2024-07-01 RX ORDER — LORATADINE 10 MG/1
10 TABLET ORAL DAILY
COMMUNITY

## 2024-07-01 RX ORDER — ALBUTEROL SULFATE 90 UG/1
2 AEROSOL, METERED RESPIRATORY (INHALATION)
COMMUNITY
Start: 2023-05-17

## 2024-07-01 SDOH — HEALTH STABILITY: PHYSICAL HEALTH: ON AVERAGE, HOW MANY DAYS PER WEEK DO YOU ENGAGE IN MODERATE TO STRENUOUS EXERCISE (LIKE A BRISK WALK)?: 5 DAYS

## 2024-07-01 ASSESSMENT — SOCIAL DETERMINANTS OF HEALTH (SDOH): HOW OFTEN DO YOU GET TOGETHER WITH FRIENDS OR RELATIVES?: ONCE A WEEK

## 2024-07-01 NOTE — PROGRESS NOTES
Preventive Care Visit  [facility]  Froylan Deutsch MD, Family Medicine  Jul 1, 2024      Assessment & Plan   Problem List Items Addressed This Visit          Other    Environmental allergies    Relevant Medications    fluticasone (FLONASE) 50 MCG/ACT nasal spray    loratadine (CLARITIN) 10 MG tablet    albuterol (PROAIR HFA/PROVENTIL HFA/VENTOLIN HFA) 108 (90 Base) MCG/ACT inhaler     Other Visit Diagnoses       Encounter for medical examination to establish care    -  Primary    Perimenopausal        Relevant Orders    Follicle stimulating hormone    Luteinizing Hormone    Estradiol           Based on reported history, it does not sound like Piedad is post menopausal at this point, having had a period to some extent up until a month ago. Discussed with Piedad that based upon her reported symptoms she is certainly no pre-menopausal. Discussed possibly starting low dose HRT but some concerns given recent palpitations. Also suggested referral to cardiology to establish with them should palpitations return. For now, Piedad reports she is OK with just getting labs as ordered. We can follow up as indicated based upon results.     Patient has been advised of split billing requirements and indicates understanding: Yes       Counseling  Appropriate preventive services were discussed with this patient, including applicable screening as appropriate for fall prevention, nutrition, physical activity, Tobacco-use cessation, weight loss and cognition.  Checklist reviewing preventive services available has been given to the patient.  Reviewed patient's diet, addressing concerns and/or questions.     The longitudinal plan of care for the diagnosis(es)/condition(s) as documented were addressed during this visit. Due to the added complexity in care, I will continue to support Piedad in the subsequent management and with ongoing continuity of care.    33 minutes spent on the date of the encounter doing chart review, history  "and exam, documentation and further activities as noted.    Froylan Deutsch MD  2:00 PM, July 1, 2024        Yani Herbert is a 53 year old, presenting for the following:  Establish Care (Pt would like to have her hormone levels checked, Estradiol, Fsh, and LH. )           [facility]  # Health Maintenance  - BP:   BP Readings from Last 3 Encounters:   07/01/24 101/69   - Cholesterol: recent labs reviewed  No results for input(s): \"CHOL\", \"HDL\", \"LDL\", \"TRIG\" in the last 30668 hours.The ASCVD Risk score (Andrea MCCLAIN, et al., 2019) failed to calculate for the following reasons:    Cannot find a previous HDL lab    Cannot find a previous total cholesterol lab  - Diabetes Screening: recent labs reviewed  - Lung Cancer Screening: not indicated  - (+) seatbelt use, (+) helmet, (+) smoke detector  - Feels safe at home, denies verbal/physical/emotional abuse in past year: yes  - Last Pap: no previous records  - Colonoscopy: no  previous records  - Mammogram: 9/2023, repeat in one year    Question about menopause/palpitations  - was on OCPs since her teens with no issues  - recently, started  having palpitations and her previous PCP recommended she stop taking the pill  - palpitations seemed to improve (she does have a family history of cardiac issues)  - but now, she has very minimal spotting with her period, she had NO period this past month  - she is also reporting increased joint pain, hot flashes  - not to the point where it's terrible, but it is annoying  - family friend who is a nurse suggested she get her FSH, LH and estradiol levels checked        7/1/2024   General Health   How would you rate your overall physical health? Good   Feel stress (tense, anxious, or unable to sleep) Not at all            7/1/2024   Nutrition   Three or more servings of calcium each day? Yes   Diet: Gluten-free/reduced   How many servings of fruit and vegetables per day? (!) 2-3   How many sweetened beverages each day? 0-1            7/1/2024 "   Exercise   Days per week of moderate/strenous exercise 5 days            7/1/2024   Social Factors   Frequency of gathering with friends or relatives Once a week   Worry food won't last until get money to buy more No   Food not last or not have enough money for food? No   Do you have housing? (Housing is defined as stable permanent housing and does not include staying ouside in a car, in a tent, in an abandoned building, in an overnight shelter, or couch-surfing.) Yes   Are you worried about losing your housing? No   Lack of transportation? No   Unable to get utilities (heat,electricity)? No            7/1/2024   Fall Risk   Fallen 2 or more times in the past year? No   Trouble with walking or balance? No             7/1/2024   Dental   Dentist two times every year? Yes            7/1/2024   TB Screening   Were you born outside of the [place]? No            Today's PHQ-2 Score:       7/1/2024    12:34 PM   PHQ-2 ( 1999 [employer])   Q1: Little interest or pleasure in doing things 0   Q2: Feeling down, depressed or hopeless 0   PHQ-2 Score 0   Q1: Little interest or pleasure in doing things Not at all   Q2: Feeling down, depressed or hopeless Not at all   PHQ-2 Score 0           7/1/2024   Substance Use   Alcohol more than 3/day or more than 7/wk No   Do you use any other substances recreationally? No           Mammogram Screening - Mammogram every 1-2 years updated in Health Maintenance based on mutual decision making        7/1/2024   STI Screening   New sexual partner(s) since last STI/HIV test? No        History of abnormal Pap smear: No - age 30- 64 PAP with HPV every 5 years recommended       ASCVD Risk   The ASCVD Risk score (Andrea MCCLAIN, et al., 2019) failed to calculate for the following reasons:    Cannot find a previous HDL lab    Cannot find a previous total cholesterol lab    The smoking status is invalid        Past Medical History:   Diagnosis Date    Benign paroxysmal positional vertigo 12/07/2021     "Migraine without aura and without status migrainosus, not intractable 10/19/2022    Seasonal allergic rhinitis due to pollen 04/24/2023    Last Assessment & Plan:    Formatting of this note might be different from the original.   Improved. Continue daily loratidine and fluticasone nasal spray   Use albuterol inhaler as needed for wheezing       Past Surgical History:   Procedure Laterality Date    AS RAD RESEC TONSIL/PILLARS      HERNIA REPAIR      MIDDLE EAR SURGERY Bilateral      History reviewed. No pertinent family history.        Review of Systems  Constitutional, HEENT, cardiovascular, pulmonary, gi and gu systems are negative, except as otherwise noted.     Objective    Exam  /69   Pulse 65   Temp 97.2  F (36.2  C)   Ht 1.66 m (5' 5.35\")   Wt 64 kg (141 lb 1.3 oz)   SpO2 99%   BMI 23.22 kg/m     Estimated body mass index is 23.22 kg/m  as calculated from the following:    Height as of this encounter: 1.66 m (5' 5.35\").    Weight as of this encounter: 64 kg (141 lb 1.3 oz).    Physical Exam  GENERAL: alert and no distress  NECK: no adenopathy, no asymmetry, masses, or scars  RESP: lungs clear to auscultation - no rales, rhonchi or wheezes  CV: regular rate and rhythm, normal S1 S2, no S3 or S4, no murmur, click or rub, no peripheral edema  ABDOMEN: soft, nontender, no hepatosplenomegaly, no masses and bowel sounds normal  MS: no gross musculoskeletal defects noted, no edema        Signed Electronically by: Froylan Deutsch MD    "

## 2024-07-02 LAB
ESTRADIOL SERPL-MCNC: <5 PG/ML
FSH SERPL IRP2-ACNC: 92 MIU/ML
LH SERPL-ACNC: 56.6 MIU/ML

## 2024-09-17 ENCOUNTER — TELEPHONE (OUTPATIENT)
Dept: FAMILY MEDICINE | Facility: CLINIC | Age: 54
End: 2024-09-17

## 2024-09-17 DIAGNOSIS — N95.1 PERIMENOPAUSAL: Primary | ICD-10-CM

## 2024-09-17 NOTE — TELEPHONE ENCOUNTER
Orders/Referrals (route to triage team)    Who is calling - Patient  Order/referral that is being requested - Gynecology  For referrals only - specify the specialty if applicable and/or location being requested  Has the patient discussed this request with their provider? Yes  Additional details/comments - Menopause related issues  Ok to leave a message on VM? Yes

## 2024-09-17 NOTE — TELEPHONE ENCOUNTER
Placing referral to OBGYN per pt request and Dr. Deutsch's visit note/lab comments on 7/2/24. Notified pt and contact info provided.    MONET Rooney, RN  09/17/24, 10:34 AM

## 2024-10-16 ENCOUNTER — OFFICE VISIT (OUTPATIENT)
Dept: MIDWIFE SERVICES | Facility: CLINIC | Age: 54
End: 2024-10-16
Payer: OTHER GOVERNMENT

## 2024-10-16 VITALS
WEIGHT: 146.6 LBS | SYSTOLIC BLOOD PRESSURE: 96 MMHG | HEIGHT: 65 IN | TEMPERATURE: 98 F | OXYGEN SATURATION: 99 % | DIASTOLIC BLOOD PRESSURE: 62 MMHG | HEART RATE: 72 BPM | BODY MASS INDEX: 24.43 KG/M2

## 2024-10-16 DIAGNOSIS — N95.0 POSTMENOPAUSAL BLEEDING: ICD-10-CM

## 2024-10-16 DIAGNOSIS — N95.1 MENOPAUSAL SYNDROME (HOT FLASHES): Primary | ICD-10-CM

## 2024-10-16 PROCEDURE — 99204 OFFICE O/P NEW MOD 45 MIN: CPT | Performed by: ADVANCED PRACTICE MIDWIFE

## 2024-10-16 RX ORDER — ESTRADIOL 0.05 MG/D
1 PATCH, EXTENDED RELEASE TRANSDERMAL
Qty: 24 PATCH | Refills: 1 | Status: SHIPPED | OUTPATIENT
Start: 2024-10-17

## 2024-10-16 RX ORDER — PROGESTERONE 100 MG/1
100 CAPSULE ORAL DAILY
Qty: 90 CAPSULE | Refills: 1 | Status: SHIPPED | OUTPATIENT
Start: 2024-10-16

## 2024-10-16 ASSESSMENT — PATIENT HEALTH QUESTIONNAIRE - PHQ9
SUM OF ALL RESPONSES TO PHQ QUESTIONS 1-9: 0
SUM OF ALL RESPONSES TO PHQ QUESTIONS 1-9: 0

## 2024-10-16 NOTE — PROGRESS NOTES
"Piedad Guerrier 54 year old .Patient had postmenopausal bleeding on 10/2/24. It was light pink in color and a few swipes on toilet paper. She reports she was on ELHAM for years, and was weaned, and then completely stopped this past May. After stopping the ELHAM, she has not had a period and has noticed an increase in symptoms.    CC: Menopause   consult     She has been experiencing:  Hot flashes    ,Joint pain, and palpitations (was worked up by cardio, and nothing was found).    Post menopause since 3/24. FSH was 92 on 24.    PMH   Past Medical History:   Diagnosis Date    Benign paroxysmal positional vertigo 2021    Migraine without aura and without status migrainosus, not intractable 10/19/2022    Seasonal allergic rhinitis due to pollen 2023    Last Assessment & Plan:    Formatting of this note might be different from the original.   Improved. Continue daily loratidine and fluticasone nasal spray   Use albuterol inhaler as needed for wheezing       PSH   Past Surgical History:   Procedure Laterality Date    AS RAD RESEC TONSIL/PILLARS      HERNIA REPAIR      MIDDLE EAR SURGERY Bilateral        Denies breast CA, clotting disorder/history, hypertension, smoking or migraine with aura     BP 96/62   Pulse 72   Temp 98  F (36.7  C)   Ht 1.651 m (5' 5\")   Wt 66.5 kg (146 lb 9.6 oz)   LMP 10/02/2024 (Approximate)   SpO2 99%   BMI 24.40 kg/m      A/P:  Menopause   (N95.1) Menopausal syndrome (hot flashes)  (primary encounter diagnosis)  Comment:   Plan: estradiol (VIVELLE-DOT) 0.05 MG/24HR bi-weekly         patch, progesterone (PROMETRIUM) 100 MG capsule            (N95.0) Postmenopausal bleeding  Comment:   Plan: US Transvaginal Pelvic Non-OB            -Discussed physiology and resources for perimenopause/menopause and given AVS  -STRAW + 10 stage  -1-1A+. Last menstrual period was 5 months ago. However, recent FSH was elevated, indicating early postmenopause.   -Discussed MHT, that systemic " estrogen therapy is the gold standard and FDA approved for treating vasomotor symptoms (hot flashes/night sweats) and osteoporosis while local estrogen therapy is FDA approved to treat genitourinary syndrome of menopause (GSM).  When using systemic estrogen MHT there is a rare increased risk of breast cancer, but considered protective for bone health, cardiovascular health, brain health, and decreasing all cause mortality.  The Menopause Society recommends starting MHT within 10 yrs of menopause and before age 60 yr/old.  -Reviewed the difference in levels of estrogen between contraceptives which is four times the dose of the estrogen in menopause hormone therapy   -Discussed the need for progesterone to balance the estrogen and prevent the risk of endometrial hyperplasia and uterine CA. Preferred methods are oral Prometrium, Mirena IUD or Kylena IUD  -Reference - The NAMS 2022 Position Statement on Hormone Therapy     -Patient started on Estrogen patch, with oral progesterone.   -Discussed recommendation for 3 month med chick in after MHT start and then annually to assess need and risks/bens. Patient states she had a PAP smear about a year ago. She denies any hx of an abnormal PAP smear or HPV. Recommended to receive one within 5 years of her last. Patient to return for pelvic US within the next 6 weeks, d/t recent spotting.  Ana [employer] Student Midwife    I was present with the MELISSA student who participated in the service and in the documentation of the services provided.  I have verified the history and personally performed the physical exam and medical decision making, as documented by the student and edited by me.  JAYDEN Larsen CNM      45 minutes spent on the date of the encounter doing chart review, history and exam, documentation and further activities per the note

## 2024-10-16 NOTE — PATIENT INSTRUCTIONS
Dear Piedad     It was nice to meet you today.    The perimenopause and menopause resources I mentioned were     A book Hot and Bothered by Gene Montilla  A podcast Ekta Hannah 'From PMS to Menopause: How to Hack Your Hormones'   The book The New Rules of Menopause; A [facility] Guide to Perimenopause and Beyond by  Dr Jennifer Mai    Many people ask about the book The New [place] Pause By Dr Olena Aguilar.  I think she does a nice job describing what happens to the body during menopause and menopause hormone therapy, but she also recommends many expensive tests and expensive supplements that are not part of the standard guidelines in menopause treatment      Things you can do today to feel good    Drink at least 64 ounces of water a day.  Staying hydrated helps your body run smoother, dehydration increases headaches and fatigue   Eat 5 servings of produce a day  Real, unprocessed foods like produce are high in fiber and natural vitamins and minerals.  Also if you are getting 5 servings you will have less craving and less room for unhealthy  processed foods   Floss you teeth If you do not floss there are many little micro infections in your gums stimulating inflammation   Move / exercise  every day Start with walking like you are late for 20 mins a day, add strength training twice a week   Limit sugar and alcohol Both have extra calories and stimulate inflammation   Manage stress Find what works for you, meditation, yoga, go for a walk, talk to a trusted friend, sing, laugh, pet a dog         Many people ask about supplements.  You will get better nutrition from eating a variety of real unprocessed foods than from any supplements.  But these are the supplements that I think can be helpful during the perimenopause/menopause transition    Vitamin D3, 2,000 international unit(s) every day  Magnesium oxide or magnesium glycinate 400-500 mg every night before bed (if it causes diarrhea switch to another form or  another brand)   Fish oil / omega 3 2,000 - 2,400 mg / day        The Stages of Reproductive Aging Workshop                       Hormones in the Female Menstrual Cycle             Hormone Levels in the Years Before and After Menopause

## 2024-10-18 ENCOUNTER — OFFICE VISIT (OUTPATIENT)
Dept: FAMILY MEDICINE | Facility: CLINIC | Age: 54
End: 2024-10-18
Payer: OTHER GOVERNMENT

## 2024-10-18 VITALS
HEART RATE: 69 BPM | BODY MASS INDEX: 24.17 KG/M2 | OXYGEN SATURATION: 98 % | HEIGHT: 65 IN | DIASTOLIC BLOOD PRESSURE: 77 MMHG | SYSTOLIC BLOOD PRESSURE: 107 MMHG | TEMPERATURE: 97 F | WEIGHT: 145.08 LBS

## 2024-10-18 DIAGNOSIS — K64.4 EXTERNAL HEMORRHOIDS: Primary | ICD-10-CM

## 2024-10-18 NOTE — PROGRESS NOTES
"  Assessment & Plan   Problem List Items Addressed This Visit    None  Visit Diagnoses       External hemorrhoids    -  Primary          Reassured Neida of very low suspicion for malignancy. Offered steroid cream to address external hemorrhoids but Neida declines now given absence of symptoms. We talked about strategies to reduce intra-abdominal pressure such as stool softening, increased hydration, increased exercise. If symptoms persist I would refer to colorectal for consideration of band ligation before proceeding with colonoscopy at this point.      The longitudinal plan of care for the diagnosis(es)/condition(s) as documented were addressed during this visit. Due to the added complexity in care, I will continue to support Neida in the subsequent management and with ongoing continuity of care.    25 minutes spent on the date of the encounter doing chart review, history and exam, documentation and further activities as noted.    Froylan Deutsch MD  4:44 PM, October 18, 2024        Subjective   Neida is a 54 year old, presenting for the following health issues:  Rectal Problem (Pt noticed blood in her stool for 2 days about 2 weeks ago.)    HPI   \"Small amount of blood in stool\"  - note from patient on 10/3/24  I have had a small amount of blood in my stool the last two days. I already have an appointment with Arlene Vu, an OB GYN on Oct 16 to discuss menopause. Should I wait and see her for this also or should I talk to Dr Deutsch? It s been 5 1/2 years since my last colonoscopy and I would like to have another one done however, my insurance will only cover one in ten years.   Today  - has BMs about every other day or so  - no further episodes of blood since the ones reported above.   - she does report a history of external hemorrhoids with no associated pain or itching  - no family history of colon cancer  - she did meet with her OBGYN recently, and they are planning to get a pelvic US to rule out possible " "other causes        Review of Systems  Constitutional, HEENT, cardiovascular, pulmonary, gi and gu systems are negative, except as otherwise noted.      Objective    /77   Pulse 69   Temp 97  F (36.1  C)   Ht 1.651 m (5' 5\")   Wt 65.8 kg (145 lb 1.3 oz)   LMP 10/02/2024 (Approximate)   SpO2 98%   BMI 24.14 kg/m    Body mass index is 24.14 kg/m .    Physical Exam   GENERAL: alert and no distress  NECK: no adenopathy, no asymmetry, masses, or scars  RESP: lungs clear to auscultation - no rales, rhonchi or wheezes  CV: regular rate and rhythm, normal S1 S2, no S3 or S4, no murmur, click or rub, no peripheral edema  ABDOMEN: soft, nontender, no hepatosplenomegaly, no masses and bowel sounds normal  RECTAL (female): visible external hemorrhoids, no active bleeding, no evidence of fissure or fistula  MS: no gross musculoskeletal defects noted, no edema        Signed Electronically by: Froylan Deutsch MD    "

## 2024-10-24 ENCOUNTER — ANCILLARY PROCEDURE (OUTPATIENT)
Dept: ULTRASOUND IMAGING | Facility: CLINIC | Age: 54
End: 2024-10-24
Attending: ADVANCED PRACTICE MIDWIFE
Payer: OTHER GOVERNMENT

## 2024-10-24 DIAGNOSIS — N95.0 POSTMENOPAUSAL BLEEDING: ICD-10-CM

## 2024-10-24 PROCEDURE — 76830 TRANSVAGINAL US NON-OB: CPT | Performed by: OBSTETRICS & GYNECOLOGY

## 2024-10-27 ENCOUNTER — MYC MEDICAL ADVICE (OUTPATIENT)
Dept: MIDWIFE SERVICES | Facility: CLINIC | Age: 54
End: 2024-10-27
Payer: OTHER GOVERNMENT

## 2024-10-27 DIAGNOSIS — N83.201 CYST OF RIGHT OVARY: Primary | ICD-10-CM

## 2025-01-06 ENCOUNTER — MYC MEDICAL ADVICE (OUTPATIENT)
Dept: MIDWIFE SERVICES | Facility: CLINIC | Age: 55
End: 2025-01-06
Payer: COMMERCIAL

## 2025-01-06 DIAGNOSIS — Z12.31 ENCOUNTER FOR SCREENING MAMMOGRAM FOR BREAST CANCER: Primary | ICD-10-CM

## 2025-01-06 DIAGNOSIS — N95.1 MENOPAUSAL SYNDROME (HOT FLASHES): ICD-10-CM

## 2025-01-07 RX ORDER — PROGESTERONE 100 MG/1
100 CAPSULE ORAL DAILY
Qty: 90 CAPSULE | Refills: 3 | Status: SHIPPED | OUTPATIENT
Start: 2025-01-07

## 2025-01-07 RX ORDER — ESTRADIOL 0.05 MG/D
1 PATCH, EXTENDED RELEASE TRANSDERMAL
Qty: 24 PATCH | Refills: 3 | Status: SHIPPED | OUTPATIENT
Start: 2025-01-09

## 2025-01-19 ENCOUNTER — HEALTH MAINTENANCE LETTER (OUTPATIENT)
Age: 55
End: 2025-01-19

## 2025-04-30 ENCOUNTER — ANCILLARY PROCEDURE (OUTPATIENT)
Dept: ULTRASOUND IMAGING | Facility: CLINIC | Age: 55
End: 2025-04-30
Attending: ADVANCED PRACTICE MIDWIFE
Payer: COMMERCIAL

## 2025-04-30 DIAGNOSIS — N83.201 CYST OF RIGHT OVARY: ICD-10-CM

## 2025-04-30 PROCEDURE — 76830 TRANSVAGINAL US NON-OB: CPT | Performed by: OBSTETRICS & GYNECOLOGY

## 2025-07-21 SDOH — HEALTH STABILITY: PHYSICAL HEALTH: ON AVERAGE, HOW MANY MINUTES DO YOU ENGAGE IN EXERCISE AT THIS LEVEL?: 40 MIN

## 2025-07-21 SDOH — HEALTH STABILITY: PHYSICAL HEALTH: ON AVERAGE, HOW MANY DAYS PER WEEK DO YOU ENGAGE IN MODERATE TO STRENUOUS EXERCISE (LIKE A BRISK WALK)?: 4 DAYS

## 2025-07-21 ASSESSMENT — SOCIAL DETERMINANTS OF HEALTH (SDOH): HOW OFTEN DO YOU GET TOGETHER WITH FRIENDS OR RELATIVES?: ONCE A WEEK

## 2025-07-22 ENCOUNTER — OFFICE VISIT (OUTPATIENT)
Dept: FAMILY MEDICINE | Facility: CLINIC | Age: 55
End: 2025-07-22
Payer: COMMERCIAL

## 2025-07-22 VITALS
OXYGEN SATURATION: 97 % | TEMPERATURE: 98.1 F | HEIGHT: 64 IN | SYSTOLIC BLOOD PRESSURE: 100 MMHG | WEIGHT: 143.08 LBS | HEART RATE: 57 BPM | DIASTOLIC BLOOD PRESSURE: 66 MMHG | BODY MASS INDEX: 24.43 KG/M2

## 2025-07-22 DIAGNOSIS — Z12.4 SCREENING FOR MALIGNANT NEOPLASM OF CERVIX: ICD-10-CM

## 2025-07-22 DIAGNOSIS — R13.10 DYSPHAGIA, UNSPECIFIED TYPE: ICD-10-CM

## 2025-07-22 DIAGNOSIS — E78.00 HYPERCHOLESTEROLEMIA: ICD-10-CM

## 2025-07-22 DIAGNOSIS — Z13.228 SCREENING FOR METABOLIC DISORDER: ICD-10-CM

## 2025-07-22 DIAGNOSIS — Z12.11 SCREEN FOR COLON CANCER: ICD-10-CM

## 2025-07-22 DIAGNOSIS — Z00.00 WELLNESS EXAMINATION: Primary | ICD-10-CM

## 2025-07-22 DIAGNOSIS — Z12.31 ENCOUNTER FOR SCREENING MAMMOGRAM FOR BREAST CANCER: ICD-10-CM

## 2025-07-22 DIAGNOSIS — E55.9 VITAMIN D DEFICIENCY: ICD-10-CM

## 2025-07-22 PROCEDURE — 80061 LIPID PANEL: CPT | Performed by: FAMILY MEDICINE

## 2025-07-22 PROCEDURE — 80048 BASIC METABOLIC PNL TOTAL CA: CPT | Performed by: FAMILY MEDICINE

## 2025-07-22 PROCEDURE — 82306 VITAMIN D 25 HYDROXY: CPT | Performed by: FAMILY MEDICINE

## 2025-07-22 NOTE — PROGRESS NOTES
"Preventive Care Visit  [facility]  Froylan Deutsch MD, Family Medicine  Jul 22, 2025      Assessment & Plan   Problem List Items Addressed This Visit    None  Visit Diagnoses         Wellness examination    -  Primary      Hypercholesterolemia        Relevant Orders    Lipid Profile      Vitamin D deficiency        Relevant Orders    Vitamin D Deficiency      Screening for metabolic disorder        Relevant Orders    Basic metabolic panel      Screen for colon cancer          Screening for malignant neoplasm of cervix          Encounter for screening mammogram for breast cancer        Relevant Orders    MA Screening Bilateral w/ [place]      Dysphagia, unspecified type        Relevant Orders    Speech Therapy  Referral    XR Video Swallow with SLP or OT - Order with Speech Therapy Referral           Reported dysphagia, sometimes, with swallowing capsules. Referral as  noted above. Will follow up results as indicated.     Patient has been advised of split billing requirements and indicates understanding: Yes    Counseling  Appropriate preventive services were addressed with this patient via screening, questionnaire, or discussion as appropriate for fall prevention, nutrition, physical activity, Tobacco-use cessation, social engagement, weight loss and cognition.  Checklist reviewing preventive services available has been given to the patient.  Reviewed patient's diet, addressing concerns and/or questions.   Reviewed preventive health counseling, as reflected in patient instructions    Froylan Deutsch MD  12:06 PM, July 22, 2025      Yani Carrasquillo is a 54 year old, presenting for the following:  Physical         HPI  # Health Maintenance  - BP:   BP Readings from Last 3 Encounters:   07/22/25 100/66   10/18/24 107/77   10/16/24 96/62   - Cholesterol: pending  No results for input(s): \"CHOL\", \"HDL\", \"LDL\", \"TRIG\" in the last 82987 hours.The ASCVD Risk score (Andrea MCCLAIN, et al., 2019) failed to calculate " "for the following reasons:    Cannot find a previous HDL lab    Cannot find a previous total cholesterol lab  - Diabetes Screening: pending  - Lung Cancer Screening: not indicated  55-81yo w/30py smoking history and currently smoking OR quit within past 15 years:  Low dose CT annually and discontinued once a person has been 15 years tobacco free  - (+) seatbelt use, (+) helmet, (+) smoke detector  - Feels safe at home, denies verbal/physical/emotional abuse in past year: yes  - Last Pap: 2022, repeat 2027  - Colonoscopy: 2020 repeat 2030  - Mammogram: imaging ordered today    Dysphagia  - occasional difficulty swallowing capsules  - worried about a possible \"mass in there\"  - doesn't feel any sort of mass, just the dysphagia       Advance Care Planning  Discussed advance care planning with patient; informed AVS has link to [employer].        7/21/2025   General Health   How would you rate your overall physical health? Good   Feel stress (tense, anxious, or unable to sleep) Not at all         7/21/2025   Nutrition   Three or more servings of calcium each day? Yes   Diet: Gluten-free/reduced   How many servings of fruit and vegetables per day? (!) 2-3   How many sweetened beverages each day? 0-1         7/21/2025   Exercise   Days per week of moderate/strenous exercise 4 days   Average minutes spent exercising at this level 40 min         7/21/2025   Social Factors   Frequency of gathering with friends or relatives Once a week   Worry food won't last until get money to buy more No   Food not last or not have enough money for food? No   Do you have housing? (Housing is defined as stable permanent housing and does not include staying outside in a car, in a tent, in an abandoned building, in an overnight shelter, or couch-surfing.) Yes   Are you worried about losing your housing? No   Lack of transportation? No   Unable to get utilities (heat,electricity)? No         7/21/2025   Fall Risk   Fallen 2 or more times " in the past year? No   Trouble with walking or balance? No          7/21/2025   Dental   Dentist two times every year? Yes         Today's PHQ-2 Score:       7/21/2025     6:43 PM   PHQ-2 ( 1999 Pfizer)   Q1: Little interest or pleasure in doing things 0   Q2: Feeling down, depressed or hopeless 0   PHQ-2 Score 0    Q1: Little interest or pleasure in doing things Not at all   Q2: Feeling down, depressed or hopeless Not at all   PHQ-2 Score 0       Patient-reported           7/21/2025   Substance Use   Alcohol more than 3/day or more than 7/wk No   Do you use any other substances recreationally? No     Social History     Tobacco Use    Smoking status: Former     Types: Cigarettes    Smokeless tobacco: Never   Vaping Use    Vaping status: Never Used   Substance Use Topics    Alcohol use: Not Currently    Drug use: Not Currently        Mammogram Screening - Mammogram every 1-2 years updated in Health Maintenance based on mutual decision making          7/21/2025   One time HIV Screening   Previous HIV test? No         7/21/2025   STI Screening   New sexual partner(s) since last STI/HIV test? No     History of abnormal Pap smear: No - age 30- 64 PAP with HPV every 5 years recommended        Past Medical History:   Diagnosis Date    Benign paroxysmal positional vertigo 12/07/2021    Migraine without aura and without status migrainosus, not intractable 10/19/2022    Seasonal allergic rhinitis due to pollen 04/24/2023    Last Assessment & Plan:    Formatting of this note might be different from the original.   Improved. Continue daily loratidine and fluticasone nasal spray   Use albuterol inhaler as needed for wheezing       Past Surgical History:   Procedure Laterality Date    AS RAD RESEC TONSIL/PILLARS      HERNIA REPAIR      MIDDLE EAR SURGERY Bilateral      History reviewed. No pertinent family history.        Review of Systems  Constitutional, HEENT, cardiovascular, pulmonary, gi and gu systems are negative, except  "as otherwise noted.     Objective    Exam  /66   Pulse 57   Temp 98.1  F (36.7  C)   Ht 1.633 m (5' 4.29\")   Wt 64.9 kg (143 lb 1.3 oz)   LMP 10/02/2024 (Approximate)   SpO2 97%   BMI 24.34 kg/m     Estimated body mass index is 24.34 kg/m  as calculated from the following:    Height as of this encounter: 1.633 m (5' 4.29\").    Weight as of this encounter: 64.9 kg (143 lb 1.3 oz).    Physical Exam  GENERAL: alert and no distress  NECK: no adenopathy, no asymmetry, masses, or scars  RESP: lungs clear to auscultation - no rales, rhonchi or wheezes  CV: regular rate and rhythm, normal S1 S2, no S3 or S4, no murmur, click or rub, no peripheral edema  ABDOMEN: soft, nontender, no hepatosplenomegaly, no masses and bowel sounds normal  MS: no gross musculoskeletal defects noted, no edema        Signed Electronically by: Froylan Deutsch MD    "

## 2025-07-22 NOTE — NURSING NOTE
"Neida  54 year old    Chief Complaint   Patient presents with    Physical           Blood pressure 100/66, pulse 57, temperature 98.1  F (36.7  C), height 1.633 m (5' 4.29\"), weight 64.9 kg (143 lb 1.3 oz), last menstrual period 10/02/2024, SpO2 97%. Body mass index is 24.34 kg/m .    Patient Active Problem List   Diagnosis    Environmental allergies    Benign paroxysmal positional vertigo    Cervical spondylosis without myelopathy    Cervico-occipital neuralgia    Dyslipidemia    Knee pain    Migraine without aura and without status migrainosus, not intractable    Palpitations    Seasonal allergic rhinitis due to pollen             Wt Readings from Last 2 Encounters:   07/22/25 64.9 kg (143 lb 1.3 oz)   10/18/24 65.8 kg (145 lb 1.3 oz)       BP Readings from Last 3 Encounters:   07/22/25 100/66   10/18/24 107/77   10/16/24 96/62                Current Outpatient Medications   Medication Sig Dispense Refill    albuterol (PROAIR HFA/PROVENTIL HFA/VENTOLIN HFA) 108 (90 Base) MCG/ACT inhaler Inhale 2 puffs into the lungs      estradiol (VIVELLE-DOT) 0.05 MG/24HR bi-weekly patch Place 1 patch over 96 hours onto the skin twice a week. 24 patch 3    fluticasone (FLONASE) 50 MCG/ACT nasal spray Spray 1 spray into both nostrils daily      Magnesium Citrate 200 MG TABS Take 400 mg by mouth daily      progesterone (PROMETRIUM) 100 MG capsule Take 1 capsule (100 mg) by mouth daily. 90 capsule 3    fish oil-omega-3 fatty acids 500 MG capsule Take 1 capsule by mouth daily      UNABLE TO FIND Take 40 mg by mouth 2 times daily MEDICATION NAME: black cohosh       No current facility-administered medications for this visit.             Social History     Tobacco Use    Smoking status: Former     Types: Cigarettes    Smokeless tobacco: Never   Vaping Use    Vaping status: Never Used   Substance Use Topics    Alcohol use: Not Currently    Drug use: Not Currently             Health Maintenance Due   Topic Date Due    LIPID  Never done " "   ADVANCE CARE PLANNING  Never done    DIABETES SCREENING  Never done    COLORECTAL CANCER SCREENING  Never done    HIV SCREENING  Never done    HEPATITIS C SCREENING  Never done    HEPATITIS B VACCINE (1 of 3 - 19+ 3-dose series) Never done    PAP  Never done    DTAP/TDAP/TD VACCINE (1 - Tdap) Never done    PNEUMOCOCCAL VACCINE 50+ YEARS (1 of 1 - PCV) Never done    ZOSTER VACCINE (1 of 2) Never done    LUNG CANCER SCREENING  Never done    COVID-19 VACCINE (3 - 2024-25 season) 09/01/2024    YEARLY PREVENTIVE VISIT  11/17/2024           No results found for: \"PAP\"           July 22, 2025 11:02 AM    "

## 2025-07-23 ENCOUNTER — PATIENT OUTREACH (OUTPATIENT)
Dept: CARE COORDINATION | Facility: CLINIC | Age: 55
End: 2025-07-23
Payer: COMMERCIAL

## 2025-07-23 LAB
ANION GAP SERPL CALCULATED.3IONS-SCNC: 11 MMOL/L (ref 7–15)
BUN SERPL-MCNC: 16.5 MG/DL (ref 6–20)
CALCIUM SERPL-MCNC: 9.4 MG/DL (ref 8.8–10.4)
CHLORIDE SERPL-SCNC: 105 MMOL/L (ref 98–107)
CHOLEST SERPL-MCNC: 253 MG/DL
CREAT SERPL-MCNC: 0.74 MG/DL (ref 0.51–0.95)
EGFRCR SERPLBLD CKD-EPI 2021: >90 ML/MIN/1.73M2
FASTING STATUS PATIENT QL REPORTED: YES
FASTING STATUS PATIENT QL REPORTED: YES
GLUCOSE SERPL-MCNC: 79 MG/DL (ref 70–99)
HCO3 SERPL-SCNC: 25 MMOL/L (ref 22–29)
HDLC SERPL-MCNC: 65 MG/DL
LDLC SERPL CALC-MCNC: 171 MG/DL
NONHDLC SERPL-MCNC: 188 MG/DL
POTASSIUM SERPL-SCNC: 4.4 MMOL/L (ref 3.4–5.3)
SODIUM SERPL-SCNC: 141 MMOL/L (ref 135–145)
TRIGL SERPL-MCNC: 83 MG/DL
VIT D+METAB SERPL-MCNC: 28 NG/ML (ref 20–50)

## (undated) DEVICE — KENDALL RADIOLUCENT FOAM MONITORING ELECTRODE -RECTANGULAR SHAPE: Brand: KENDALL

## (undated) DEVICE — Device

## (undated) DEVICE — 1200 GUARD II KIT W/5MM TUBE W/O VAC TUBE: Brand: GUARDIAN

## (undated) DEVICE — SET GRAV CK VLV NEEDLESS ST 3 GANGED 4WAY STPCOCK HI FLO 10

## (undated) DEVICE — CATH IV AUTOGRD BC PNK 20GA 25 -- INSYTE

## (undated) DEVICE — ADULT SPO2 SENSOR: Brand: NELLCOR

## (undated) DEVICE — BAG BELONG PT PERS CLEAR HANDL

## (undated) DEVICE — 3M™ CUROS™ DISINFECTING CAP FOR NEEDLELESS CONNECTORS 270/CARTON 20 CARTONS/CASE CFF1-270: Brand: CUROS™

## (undated) DEVICE — KIT COLON W/ 1.1OZ LUB AND 2 END

## (undated) DEVICE — SOLIDIFIER MEDC 1200ML -- CONVERT TO 356117

## (undated) DEVICE — CANN NASAL O2 CAPNOGRAPHY AD -- FILTERLINE